# Patient Record
Sex: FEMALE | Race: WHITE | URBAN - METROPOLITAN AREA
[De-identification: names, ages, dates, MRNs, and addresses within clinical notes are randomized per-mention and may not be internally consistent; named-entity substitution may affect disease eponyms.]

---

## 2018-01-05 ENCOUNTER — HOSPITAL ENCOUNTER (OUTPATIENT)
Dept: MEDSURG UNIT | Facility: HOSPITAL | Age: 58
End: 2018-01-06
Attending: SURGERY | Admitting: INTERNAL MEDICINE

## 2018-01-05 LAB
ANION GAP SERPL CALC-SCNC: 15 MMOL/L
BUN SERPL-MCNC: 9 MG/DL (ref 7–18)
CHLORIDE SERPL-SCNC: 104 MMOL/L (ref 98–109)
CREAT SERPL-MCNC: 0.8 MG/DL (ref 0.6–1.3)
GLUCOSE SERPL-MCNC: 105 MG/DL (ref 70–105)
HCT VFR BLD CALC: 41 % (ref 38–51)
HGB BLD-MCNC: 13.9 MG/DL (ref 12–17)
LACTATE SERPL-SCNC: 0.7 MMOL/L (ref 0.4–2)
LACTATE SERPL-SCNC: 0.9 MMOL/L (ref 0.4–2)
LACTATE SERPL-SCNC: 1 MMOL/L (ref 0.4–2)
POC IONIZED CALCIUM: 1.1 MMOL/L (ref 1.12–1.32)
POC TCO2: 24 MMOL/L (ref 22–27)
POC TROPONIN: 0 NG/ML (ref 0–0.08)
POTASSIUM BLD-SCNC: 4.1 MMOL/L (ref 3.5–4.9)
SODIUM BLD-SCNC: 138 MMOL/L (ref 138–146)

## 2018-01-06 LAB
ABS NEUT (OLG): 6.4 X10(3)/MCL (ref 2.1–9.2)
ALBUMIN SERPL-MCNC: 3.5 GM/DL (ref 3.4–5)
ALBUMIN/GLOB SERPL: 1.3 RATIO (ref 1.1–2)
ALP SERPL-CCNC: 69 UNIT/L (ref 38–126)
ALT SERPL-CCNC: 35 UNIT/L (ref 12–78)
AST SERPL-CCNC: 28 UNIT/L (ref 15–37)
BASOPHILS # BLD AUTO: 0 X10(3)/MCL (ref 0–0.2)
BASOPHILS NFR BLD AUTO: 1 %
BILIRUB SERPL-MCNC: 0.6 MG/DL (ref 0.2–1)
BILIRUBIN DIRECT+TOT PNL SERPL-MCNC: 0.2 MG/DL (ref 0–0.5)
BILIRUBIN DIRECT+TOT PNL SERPL-MCNC: 0.4 MG/DL (ref 0–0.8)
BUN SERPL-MCNC: 13 MG/DL (ref 7–18)
CALCIUM SERPL-MCNC: 8.8 MG/DL (ref 8.5–10.1)
CHLORIDE SERPL-SCNC: 105 MMOL/L (ref 98–107)
CO2 SERPL-SCNC: 26 MMOL/L (ref 21–32)
CREAT SERPL-MCNC: 0.69 MG/DL (ref 0.55–1.02)
EOSINOPHIL # BLD AUTO: 0 X10(3)/MCL (ref 0–0.9)
EOSINOPHIL NFR BLD AUTO: 0 %
ERYTHROCYTE [DISTWIDTH] IN BLOOD BY AUTOMATED COUNT: 13.2 % (ref 11.5–17)
GLOBULIN SER-MCNC: 2.7 GM/DL (ref 2.4–3.5)
GLUCOSE SERPL-MCNC: 80 MG/DL (ref 74–106)
HCT VFR BLD AUTO: 37.8 % (ref 37–47)
HGB BLD-MCNC: 12.3 GM/DL (ref 12–16)
LYMPHOCYTES # BLD AUTO: 1.6 X10(3)/MCL (ref 0.6–4.6)
LYMPHOCYTES NFR BLD AUTO: 18 %
MCH RBC QN AUTO: 30.9 PG (ref 27–31)
MCHC RBC AUTO-ENTMCNC: 32.5 GM/DL (ref 33–36)
MCV RBC AUTO: 95 FL (ref 80–94)
MONOCYTES # BLD AUTO: 0.7 X10(3)/MCL (ref 0.1–1.3)
MONOCYTES NFR BLD AUTO: 8 %
NEUTROPHILS # BLD AUTO: 6.4 X10(3)/MCL (ref 2.1–9.2)
NEUTROPHILS NFR BLD AUTO: 73 %
PLATELET # BLD AUTO: 195 X10(3)/MCL (ref 130–400)
PMV BLD AUTO: 10.1 FL (ref 9.4–12.4)
POTASSIUM SERPL-SCNC: 4.2 MMOL/L (ref 3.5–5.1)
PROT SERPL-MCNC: 6.2 GM/DL (ref 6.4–8.2)
RBC # BLD AUTO: 3.98 X10(6)/MCL (ref 4.2–5.4)
SODIUM SERPL-SCNC: 141 MMOL/L (ref 136–145)
WBC # SPEC AUTO: 8.8 X10(3)/MCL (ref 4.5–11.5)

## 2018-11-15 ENCOUNTER — HISTORICAL (OUTPATIENT)
Dept: ADMINISTRATIVE | Facility: HOSPITAL | Age: 58
End: 2018-11-15

## 2018-11-15 LAB
FLUAV AG NPH QL IA: NEGATIVE
FLUBV AG NPH QL IA: NEGATIVE

## 2020-01-24 ENCOUNTER — HISTORICAL (OUTPATIENT)
Dept: ADMINISTRATIVE | Facility: HOSPITAL | Age: 60
End: 2020-01-24

## 2020-01-24 LAB
FLUAV AG NPH QL IA: POSITIVE
FLUBV AG NPH QL IA: NEGATIVE

## 2022-04-10 ENCOUNTER — HISTORICAL (OUTPATIENT)
Dept: ADMINISTRATIVE | Facility: HOSPITAL | Age: 62
End: 2022-04-10

## 2022-04-27 VITALS
DIASTOLIC BLOOD PRESSURE: 60 MMHG | WEIGHT: 173.06 LBS | SYSTOLIC BLOOD PRESSURE: 130 MMHG | OXYGEN SATURATION: 98 % | HEIGHT: 65 IN | BODY MASS INDEX: 28.83 KG/M2

## 2022-04-30 NOTE — ED PROVIDER NOTES
Patient:   Rayne Jones            MRN: [id number]            FIN: [id number]               Age:   57 years     Sex:  Female     :  1960   Associated Diagnoses:   Traumatic closed nondisplaced fracture of one rib of left side; Acute chest wall pain; Fall; Hypoxia   Author:   Sam Mejias MD      Basic Information   Time seen: Date & time 2018 04:48:00.   History source: Patient, EMS.   Arrival mode: Ambulance.   History limitation: None.      History of Present Illness   The patient presents following 58 y/o CF with a history of HTN, presents to the ED via EMS with complaints of left lower chest wall pain, onset of 1900 on 18, with dizziness and sweating, onset of today a few hours after the fall. Pt states that she slipped in the bathtub after taking a shower, and fell on her left lower chest. She woke up during the night, and had trouble getting out of bed due to the pain, prompting her visit to the ED. She states the pain is worse to palpation, taking deep breaths or movement. She denies hitting her head, vomiting or any LOC. Pt was given 100 mcg Fentanyl en route via EMS. .  The onset was 10  hours ago.  The occurrence was single episode.  The fall was described as slipped.  The location where the incident occurred was at home.  Location: Left lower chest wall. The character of symptoms is pain.  The degree at present is minimal.  The exacerbating factor is movement.  The relieving factor is none.  Risk factors consist of none.  The patient's dominant hand is unknown.  Therapy today: prescription medications including 100 mcg Fentanyl and emergency medical services.  Preceding symptoms none.  Associated symptoms: dizziness, denies vomiting and denies loss of consciousness.        Review of Systems   Constitutional symptoms:  Sweats.   Skin symptoms:  Negative except as documented in HPI.   Eye symptoms:  Negative except as documented in HPI.   ENMT symptoms:  Negative except as  documented in HPI.   Respiratory symptoms:  Negative except as documented in HPI.   Cardiovascular symptoms:  Negative except as documented in HPI.   Gastrointestinal symptoms:  No vomiting,    Genitourinary symptoms:  Negative except as documented in HPI.   Musculoskeletal symptoms:  Left lower chest wall pain.   Neurologic symptoms:  Dizziness, No altered level of consciousness,       Health Status   Allergies: No known allergies.   Medications:  (Selected)   Prescriptions  Prescribed  Ultram 50 mg oral tablet: 50 mg = 1 tab(s), Oral, q6hr, PRN PRN as needed for pain, # 12 tab(s), 0 Refill(s)  Documented Medications  Documented  CRESTOR 20MG TABLETS: 20 mg = 1 tab(s), Oral, qPM  Daily Multiple for Women 50+ oral tablet: 0 Refill(s)  NexIUM OTC 20 mg oral delayed release capsule: 0 Refill(s)  ROSUVASTATIN CALCIUM 20 MG TABS: mg tab(s), Oral  Zyrtec 10 mg oral tablet: 10 mg = 1 tab(s), Oral, Daily, # 30 tab(s), 0 Refill(s)  aspirin 81 mg oral Delayed Release (EC) tablet: 81 mg = 1 tab(s), Oral, Daily, # 30 tab(s), 0 Refill(s).      Past Medical/ Family/ Social History   Medical history: HTN.   Surgical history: Negative.   Social history: Tobacco use: Regularly.      Physical Examination               Vital Signs   Vital Signs   1/5/2018 4:42 CST        Temperature Oral          37.1 DegC                             Temperature Oral (calculated)             98.78 DegF                             Peripheral Pulse Rate     97 bpm                             Respiratory Rate          18 br/min                             SpO2                      94 %                             Oxygen Therapy            Room air                             Systolic Blood Pressure   136 mmHg                             Diastolic Blood Pressure  81 mmHg  .   Measurements   1/5/2018 4:42 CST        Weight Dosing             75 kg                             Weight Measured and Calculated in Lbs     165.34 lb  .   Basic Oxygen Information    1/5/2018 4:42 CST        SpO2                      94 %                             Oxygen Therapy            Room air  .   General:  Alert, no acute distress.    Skin:  Warm, dry, intact.    Head:  Normocephalic, atraumatic.    Eye   Cardiovascular:  Regular rate and rhythm, Normal peripheral perfusion, No edema.    Respiratory:  Lungs are clear to auscultation, respirations are non-labored, breath sounds are equal, Symmetrical chest wall expansion.    Gastrointestinal:  Soft, Nontender, Non distended, Normal bowel sounds, Guarding: Negative, Rebound: Negative.    Musculoskeletal:  No deformity, Tenderness to palpation of the lateral and posterior left lower chest wall.    Neurological:  Alert and oriented to person, place, time, and situation, No focal neurological deficit observed, CN II-XII intact, normal speech observed.    Psychiatric:  Cooperative, appropriate mood & affect.       Medical Decision Making   Differential Diagnosis:  Fall, contusion, sprain, strain, closed fracture, not open fracture.    Documents reviewed:  Emergency department nurses' notes.   Electrocardiogram:  Time 1/5/2018 05:22:00, rate 80, normal sinus rhythm, No ST-T changes, no ectopy, normal OR & QRS intervals, EP Interp.    Radiology results:  Chest and left ribs, emergency physician interpretation: closed, nondisplaced rib fracture. left basilar atelectasis.       Reexamination/ Reevaluation   Assessment: Discussed incentive spirometer use.Weaned off O2, sats 95% or better.  Discussed pain control and side effects of medication.  Offered inpatient admission if pain not controlled the patient wants to attempt home with oral medication.  Warning precautions given specifically fever productive sputum Discussed incentive spirometer use. .   Time: 1/5/2018 06:16:00 .   Vital signs   results included from flowsheet : Vital Signs   1/5/2018 6:15 CST        SpO2                      94 %                             Oxygen Therapy             Nasal cannula    1/5/2018 6:14 CST        SpO2                      86 %  LOW                             Oxygen Therapy            Room air     Assessment: O2 dropped to 86% while on room air while sitting up and wide awake. Pt requires admission for supplemental oxygen and pain control.      Impression and Plan   Diagnosis   Traumatic closed nondisplaced fracture of one rib of left side (EST06-JT S22.32XA)   Acute chest wall pain (TLA75-ZV R07.89)   Acute chest wall pain (IUF22-KX R07.89)   Fall (SPS02-VS W19.XXXA)   Hypoxia (FZQ89-MR R09.02)      Calls-Consults   -  Sandra Jacobo   Plan   Condition: Stable.    Disposition: Medically cleared, Discharged: to home.    Prescriptions: Launch prescriptions   Pharmacy:  naproxen 375 mg (as sodium) oral tablet extended release (Prescribe): 750 mg = 2 tab(s), Oral, Daily, X 7 day(s), # 14 tab(s), 0 Refill(s)  Norco 7.5 mg-325 mg oral tablet (Prescribe): 1 tab(s), Oral, q4hr, PRN PRN for pain, X 7 day(s), # 20 tab(s), 0 Refill(s)  , Launch prescriptions   Pharmacy:  Zofran ODT 4 mg oral tablet, disintegrating (Prescribe): 4 mg = 1 tab(s), Oral, q4hr, PRN PRN nausea/vomiting, X 2 day(s), # 12 tab(s), 0 Refill(s)  .    Patient was given the following educational materials: Chest Wall Pain, Rib Fracture, Rib Fracture, Chest Wall Pain, Incentive Spirometer.    Follow up with: Follow up with primary care provider Within 3 to 5 days Return to ED if condition changes or worsens in any way, Follow up with primary care provider Within 3 to 5 days Return to ED if condition changes or worsens in any way.    Counseled: Patient, Regarding diagnosis, Regarding diagnostic results, Regarding treatment plan, Patient indicated understanding of instructions.    Notes: IJulian, acted solely as a scribe for and in the presence of Dr. Mejias who performed the service. , I, Dr Mejias have read note from scribe and I agree with history and physical except as amended by  me.  All information was dictated from my history and my examination of patient..

## 2022-04-30 NOTE — H&P
Patient:   Rayne Jones            MRN: [id number]            FIN: [id number]               Age:   57 years     Sex:  Female     :  1960   Associated Diagnoses:   None   Author:   Mike Jones      Basic Information   Admit information:  Fall with left sided chest pain .    Source of history:  Self, Medical record.    Present at bedside:  Medical personnel.    Referral source:  Emergency department.    History limitation:  None.       Chief Complaint   2018 4:42 CST        Pt states fell last night and hit chest on tub.  Pt now has pain worsening to left chest wall and back        History of Present Illness   Patient is a 57-year-old white female who states that last night while getting out of the shower dry her leg she tripped over and fell and hit the bathtub the left side of her chest.  She did not initially come to the emergency department and with a bit.  However, at 3 AM she was awoken due to the pain and called an ambulance to return to the emergency department.  She was found to have rib fractures on x-ray and plan was to discharge the patient with incentive spirometry and pain medications and good education.  However, the patient's saturations were 86% on room air when he tried to discharge her.  The patient complains of left anterior chest pain right around the mammary fold as well as a left lateral pain on the rib cage.  She states that the pain is worse with deep breathing and palpation.  Pain is relieved by shallow improving.  Mild shortness of breath.  No cough.  No other complaints.  Did not lose consciousness.  This was not a syncopal episode.      Review of Systems   Eye:  Negative.    Ear/Nose/Mouth/Throat:  Negative.    Respiratory:  Shortness of breath, No cough.    Cardiovascular:  Negative.    Gastrointestinal:  Negative.    Genitourinary:  Negative.    Musculoskeletal:  Trauma, As above.       Health Status   Allergies:    Allergic Reactions (Selected)  No  Known Medication Allergies   Problem list:    All Problems  Tobacco user / SNOMED CT [id number] / Probable      Histories   Past Medical History: Hypertension that is not controlled by medication.  Hypercholesterolemia.   Social History        Social & Psychosocial Habits    Tobacco  08/25/2017  Use: Current every day smoker    Type: Cigarettes    Tobacco use per day: 20  .        Physical Examination   General:  Alert and oriented, No acute distress.    HENT:  Normocephalic, Tympanic membranes are clear.    Neck:  Supple, Non-tender.    Respiratory:  Lungs are clear to auscultation, Breath sounds are equal, Symmetrical chest wall expansion, Moderate chest wall tenderness on the left.  No flail segment.  Lung sounds are mildly diminished bilaterally and likely due to poor effort.  No adventitious sounds noted..    Cardiovascular:  Normal rate, Regular rhythm.    Gastrointestinal:  Soft, Non-distended.    Vital Signs   1/5/2018 10:49 CST       Peripheral Pulse Rate     88 bpm                             Heart Rate Monitored      88 bpm                             Respiratory Rate          24 br/min                             SpO2                      93 %  LOW    1/5/2018 10:33 CST       Peripheral Pulse Rate     80 bpm                             Heart Rate Monitored      82 bpm                             Respiratory Rate          17 br/min                             SpO2                      94 %                             Oxygen Therapy            Nasal cannula                             Oxygen Flow Rate          0.5 L/min                             Systolic Blood Pressure   123 mmHg                             Diastolic Blood Pressure  53 mmHg  LOW                             Mean Arterial Pressure, Cuff              76 mmHg    1/5/2018 10:30 CST       Peripheral Pulse Rate     82 bpm                             Heart Rate Monitored      82 bpm                             Respiratory Rate          25 br/min   HI                             SpO2                      94 %                             Oxygen Therapy            Nasal cannula                             Oxygen Flow Rate          1 L/min                             Systolic Blood Pressure   123 mmHg                             Diastolic Blood Pressure  53 mmHg  LOW                             Mean Arterial Pressure, Cuff              76 mmHg    1/5/2018 9:05 CST        SpO2                      88 %  LOW                             SpO2                      88 %  LOW    1/5/2018 9:00 CST        Peripheral Pulse Rate     85 bpm                             Heart Rate Monitored      85 bpm                             Respiratory Rate          12 br/min                             SpO2                      91 %  LOW                             SpO2                      91 %  LOW                             Oxygen Therapy            Nasal cannula                             Oxygen Flow Rate          1 L/min                             Systolic Blood Pressure   118 mmHg                             Diastolic Blood Pressure  69 mmHg                             Mean Arterial Pressure, Cuff              85 mmHg    1/5/2018 8:30 CST        Peripheral Pulse Rate     81 bpm                             Heart Rate Monitored      81 bpm                             Respiratory Rate          14 br/min                             SpO2                      96 %                             Oxygen Therapy            Room air                             Systolic Blood Pressure   134 mmHg                             Diastolic Blood Pressure  63 mmHg                             Mean Arterial Pressure, Cuff              87 mmHg    1/5/2018 8:05 CST        SpO2                      99 %                             SpO2                      99 %    1/5/2018 7:04 CST        Peripheral Pulse Rate     76 bpm                             Heart Rate Monitored      76 bpm                              Respiratory Rate          15 br/min                             SpO2                      98 %                             Oxygen Therapy            Nasal cannula                             Oxygen Flow Rate          2 L/min                             Systolic Blood Pressure   138 mmHg                             Diastolic Blood Pressure  73 mmHg                             Mean Arterial Pressure, Cuff              95 mmHg    1/5/2018 7:01 CST        SpO2                      98 %    1/5/2018 6:15 CST        Peripheral Pulse Rate     88 bpm                             Heart Rate Monitored      88 bpm                             Respiratory Rate          19 br/min                             SpO2                      94 %                             Oxygen Therapy            Nasal cannula    1/5/2018 6:14 CST        Peripheral Pulse Rate     85 bpm                             Heart Rate Monitored      84 bpm                             Respiratory Rate          16 br/min                             SpO2                      86 %  LOW                             Oxygen Therapy            Room air    1/5/2018 5:33 CST        Peripheral Pulse Rate     80 bpm                             Heart Rate Monitored      81 bpm                             Respiratory Rate          17 br/min                             SpO2                      97 %                             Oxygen Therapy            Nasal cannula                             Systolic Blood Pressure   123 mmHg                             Diastolic Blood Pressure  68 mmHg                             Mean Arterial Pressure, Cuff              86 mmHg    1/5/2018 5:30 CST        Peripheral Pulse Rate     83 bpm                             Heart Rate Monitored      83 bpm                             Respiratory Rate          20 br/min                             SpO2                      97 %                             Systolic Blood Pressure   136 mmHg                              Diastolic Blood Pressure  77 mmHg                             Mean Arterial Pressure, Cuff              97 mmHg    1/5/2018 5:00 CST        SpO2                      90 %  LOW                             Oxygen Therapy            Room air    1/5/2018 4:42 CST        Temperature Oral          37.1 DegC                             Temperature Oral (calculated)             98.78 DegF                             Peripheral Pulse Rate     97 bpm                             Respiratory Rate          18 br/min                             SpO2                      94 %                             Oxygen Therapy            Room air                             Systolic Blood Pressure   136 mmHg                             Diastolic Blood Pressure  81 mmHg     Measurements from flowsheet : Measurements   1/5/2018 4:42 CST        Weight Dosing             75 kg                             Weight Measured and Calculated in Lbs     165.34 lb     Musculoskeletal:  Normal range of motion, Normal strength, No deformity, Tender as above.    Neurologic:  Alert, Oriented.    Psychiatric:  Cooperative.       Review / Management   Accession: [id number]  Order: XR Ribs Left W PA Chest  Report Dt/Tm: 01/05/2018 09:43  Report:   Indication: Chest wall pain     FINDINGS: A frontal view of the chest and 4 additional views of the  left rib cage with technique for bone detail were performed. No prior  chest x-rays are available for comparison.     Heart size is normal. There is mild atelectasis visible at both lung  bases. Lungs are otherwise clear. Pulmonary vasculature is normal. The  additional views of the left rib cage demonstrate mild focal  angulation suggesting a nondisplaced fracture laterally in the left  eighth rib best seen on the oblique view. No pneumothorax or free  pleural fluid is identified.     IMPRESSION:  1. Suspected nondisplaced fracture of the left eighth rib.  2. No evidence of acute pulmonary  disease.        Results review:     No qualifying data available.    Laboratory Results   Today's Lab Results : PowerNote Discrete Results   1/5/2018 7:19 CST        Lactic Acid Lvl           1.0 mmol/L    1/5/2018 5:35 CST        POC TCO2                  24.0 mmol/L                             POC Hb                    13.9 mg/dL                             POC Hct                   41.0 %                             POC Sodium                138 mmol/L                             POC Potassium             4.1 mmol/L                             POC Chloride              104 mmol/L                             POC Ion Calcium           1.10 mmol/L  LOW                             POC Glucose               105 mg/dL                             POC BUN                   9.0 mg/dL                             POC Creatinine            0.8 mg/dL                             POC AGAP                  15.0  NA    1/5/2018 5:33 CST        POC Troponin              0.00 ng/mL           Impression and Plan   Patient be placed on Lovenox for DVT prophylaxis.  Long discussion with the patient about the importance of incentive spirometry, deep breathing, coughing, and mobilization.  She understands the risk of poor inspiratory effort.  She will be placed on nasal cannula in the nurses have been asked to aggressively wean this down.  The patient is anxious to get home but understands the risks of going home with oxygen saturations in the 80s.  She understands that smoking previously and after discharge will complicate her issues.  We will control her pain with multimodal pain control and watch her oxygen saturation closely.  The patient can be discharged once her oxygen saturations are greater than 90% while ambulating and sleeping.  She understands this plan and agrees.

## 2022-04-30 NOTE — DISCHARGE SUMMARY
Patient:   Rayne Jones            MRN: [id number]            FIN: [id number]               Age:   57 years     Sex:  Female     :  1960   Associated Diagnoses:   None   Author:   Mariposa Davis MD      Admit  Discharge   Admission diagnosis L 8th rib frature with desaturation  Discharge diagnosisi same  Admitting MD Dr. Kuhn  Discharge MD Dr Davis    Hospital course  57yoF with fall presented to ED and w/u revealed L 8th rib fracture without pneumothorax.  Patient had significant smoking history and desats in the ED and thus was admitted to obs.  The pt had pain control and pulm toilet and did not require O2 o/n.  On HD#2 the patient was amenable to discharge.    Dispo- d/c home  Diet- resume previous diet  Activitiy- ad toro  Wound care- none  Meds see rec  F/U with PCP in 1-2 weeks

## 2025-05-06 ENCOUNTER — LAB VISIT (OUTPATIENT)
Dept: LAB | Facility: HOSPITAL | Age: 65
End: 2025-05-06
Attending: INTERNAL MEDICINE
Payer: MEDICARE

## 2025-05-06 DIAGNOSIS — R07.2 PRECORDIAL PAIN: ICD-10-CM

## 2025-05-06 DIAGNOSIS — K21.9 GASTROESOPHAGEAL REFLUX DISEASE, UNSPECIFIED WHETHER ESOPHAGITIS PRESENT: ICD-10-CM

## 2025-05-06 DIAGNOSIS — E78.5 HYPERLIPIDEMIA, UNSPECIFIED HYPERLIPIDEMIA TYPE: ICD-10-CM

## 2025-05-06 DIAGNOSIS — F41.9 ANXIETY DISORDER OF CHILDHOOD OR ADOLESCENCE: Primary | ICD-10-CM

## 2025-05-06 DIAGNOSIS — I25.10 CORONARY ATHEROSCLEROSIS OF NATIVE CORONARY ARTERY: ICD-10-CM

## 2025-05-06 DIAGNOSIS — I10 ESSENTIAL HYPERTENSION, MALIGNANT: ICD-10-CM

## 2025-05-06 LAB
ALBUMIN SERPL-MCNC: 3.9 G/DL (ref 3.4–4.8)
ALBUMIN/GLOB SERPL: 1.3 RATIO (ref 1.1–2)
ALP SERPL-CCNC: 78 UNIT/L (ref 40–150)
ALT SERPL-CCNC: 25 UNIT/L (ref 0–55)
ANION GAP SERPL CALC-SCNC: 9 MEQ/L
AST SERPL-CCNC: 25 UNIT/L (ref 11–45)
BILIRUB SERPL-MCNC: 0.4 MG/DL
BUN SERPL-MCNC: 13 MG/DL (ref 9.8–20.1)
CALCIUM SERPL-MCNC: 9.1 MG/DL (ref 8.4–10.2)
CHLORIDE SERPL-SCNC: 106 MMOL/L (ref 98–107)
CO2 SERPL-SCNC: 25 MMOL/L (ref 23–31)
CREAT SERPL-MCNC: 0.71 MG/DL (ref 0.55–1.02)
CREAT/UREA NIT SERPL: 18
ERYTHROCYTE [DISTWIDTH] IN BLOOD BY AUTOMATED COUNT: 14 % (ref 11.5–17)
GFR SERPLBLD CREATININE-BSD FMLA CKD-EPI: >60 ML/MIN/1.73/M2
GLOBULIN SER-MCNC: 3 GM/DL (ref 2.4–3.5)
GLUCOSE SERPL-MCNC: 91 MG/DL (ref 82–115)
HCT VFR BLD AUTO: 42.8 % (ref 37–47)
HGB BLD-MCNC: 13.7 G/DL (ref 12–16)
MCH RBC QN AUTO: 30 PG (ref 27–31)
MCHC RBC AUTO-ENTMCNC: 32 G/DL (ref 33–36)
MCV RBC AUTO: 93.9 FL (ref 80–94)
NRBC BLD AUTO-RTO: 0 %
PLATELET # BLD AUTO: 231 X10(3)/MCL (ref 130–400)
PMV BLD AUTO: 9.7 FL (ref 7.4–10.4)
POTASSIUM SERPL-SCNC: 4.5 MMOL/L (ref 3.5–5.1)
PROT SERPL-MCNC: 6.9 GM/DL (ref 5.8–7.6)
RBC # BLD AUTO: 4.56 X10(6)/MCL (ref 4.2–5.4)
SODIUM SERPL-SCNC: 140 MMOL/L (ref 136–145)
TSH SERPL-ACNC: 1.62 UIU/ML (ref 0.35–4.94)
WBC # BLD AUTO: 7.67 X10(3)/MCL (ref 4.5–11.5)

## 2025-05-06 PROCEDURE — 85027 COMPLETE CBC AUTOMATED: CPT

## 2025-05-06 PROCEDURE — 36415 COLL VENOUS BLD VENIPUNCTURE: CPT

## 2025-05-06 PROCEDURE — 80053 COMPREHEN METABOLIC PANEL: CPT

## 2025-05-06 PROCEDURE — 84443 ASSAY THYROID STIM HORMONE: CPT
